# Patient Record
Sex: FEMALE | Race: WHITE | NOT HISPANIC OR LATINO | Employment: UNEMPLOYED | URBAN - METROPOLITAN AREA
[De-identification: names, ages, dates, MRNs, and addresses within clinical notes are randomized per-mention and may not be internally consistent; named-entity substitution may affect disease eponyms.]

---

## 2024-02-22 ENCOUNTER — TRANSFERRED RECORDS (OUTPATIENT)
Dept: HEALTH INFORMATION MANAGEMENT | Facility: CLINIC | Age: 59
End: 2024-02-22

## 2024-02-23 ENCOUNTER — MEDICAL CORRESPONDENCE (OUTPATIENT)
Dept: HEALTH INFORMATION MANAGEMENT | Facility: CLINIC | Age: 59
End: 2024-02-23

## 2024-02-23 ENCOUNTER — TRANSCRIBE ORDERS (OUTPATIENT)
Dept: OTHER | Age: 59
End: 2024-02-23

## 2024-02-23 DIAGNOSIS — M81.0 OSTEOPOROSIS: Primary | ICD-10-CM

## 2024-12-05 NOTE — CONFIDENTIAL NOTE
RECORDS RECEIVED FROM: internal    DATE RECEIVED: 12.31.24    NOTES (FOR ALL VISITS) STATUS DETAILS   OFFICE NOTES from referring provider External   [facility] Meri Cochran MD      OFFICE NOTES from other specialist Received  [facility] [place]   2.22.24    MEDICATION LIST internal     LABS     DIABETES: HBGA1C, CREATININE, FASTING LIPIDS, MICROALBUMIN URINE, POTASSIUM, TSH, T4    THYROID: TSH, T4, CBC, THYRODLONULIN, TOTAL T3, FREE T4, CALCITONIN, CEA internal  [facility] [place]   Lipid- 2.22.24   HBGA1C- 2.22.24

## 2024-12-31 ENCOUNTER — VIRTUAL VISIT (OUTPATIENT)
Dept: ENDOCRINOLOGY | Facility: CLINIC | Age: 59
End: 2024-12-31
Payer: COMMERCIAL

## 2024-12-31 ENCOUNTER — PRE VISIT (OUTPATIENT)
Dept: ENDOCRINOLOGY | Facility: CLINIC | Age: 59
End: 2024-12-31

## 2024-12-31 DIAGNOSIS — M81.0 AGE-RELATED OSTEOPOROSIS WITHOUT CURRENT PATHOLOGICAL FRACTURE: ICD-10-CM

## 2024-12-31 RX ORDER — ALENDRONATE SODIUM 70 MG/1
70 TABLET ORAL
Qty: 12 TABLET | Refills: 3 | Status: SHIPPED | OUTPATIENT
Start: 2024-12-31

## 2024-12-31 NOTE — LETTER
12/31/2024       RE: Beulah Johnson  [address]  [place] [address]     Dear Colleague,    Thank you for referring your patient, Beulah Johnson, to the [facility] ENDOCRINOLOGY CLINIC [place] at [facility]. Please see a copy of my visit note below.    Endocrinology Clinic Visit 12/31/2024      Video-Visit Details    Type of service:  Video Visit    Video Start Time (time video started): 10:17 AM    Video End Time (time video stopped): 10:59 AM    Originating Location (pt. Location): Home        Distant Location (provider location):  On-site    Mode of Communication:  Video Conference via [employer]    Physician has received verbal consent for a Video Visit from the patient? Yes    I spent a total of 61 minutes on the date of encounter reviewing medical records, evaluating the patient, coordinating care and documenting in the EHR, as detailed above.  The longitudinal plan of care for the diagnosis(es)/condition(s) as documented were addressed during this visit. Due to the added complexity in care, I will continue to support Beulah in the subsequent management and with ongoing continuity of care.      NAME:  Beulah Johnson  PCP:  No Ref-Primary, Physician  MRN:  [id number]  Reason for Consult:  osteoporosis  Requesting Provider:  Meri Herr    Chief Complaint     Chief Complaint   Patient presents with     Consult     Osteoporosis        History of Present Illness     Beulah Johnson is a 59 year old female who is seen in video visit for assessment for osteoporosis.  He does not have any significant past medical history apart from osteoporosis today is her first visit with me for assessment and management of osteoporosis.    She had DEXA bone scan in March 2021 showed osteopenia.  Repeated DEXA bone scan at [facility] on 2/14/2023:  Lumbar Spine:     Average BMD (g/cm2): 0.741     T-score: -2.8     Z-score: -1.5       Left Femoral Neck:      Average BMD (g/cm2): 0.683     T-score: -1.5     Z-score: -0.3       Left Hip Total:     Average BMD (g/cm2): 0.825     T-score: -1.0     Z-score: -0.1       Vertebral assessment: frontal and lateral scanograms demonstrate no compression deformities in the thoracic or lumbar spine. Compared to the previous study, the measured bone density in the lumbar spine has not changed significantly and the measured bone density in the left hip has decreased by 6%.     FRAX (10-year Fracture Risk calculated for an untreated patient): not reported in patients with osteoporosis.       The lumbar spine TBS is 1.351 which suggests a normal bone microarchitecture compared to reference population.     No previous medications for osteoporosis.       Calcium intake:   Dietary: no Milk , eats yogurt 2 servings per week , no ice-cream , cottage cheese half cup 2ouple of times/week and sometimes eats slices.  Supplements: Calcium carbonate 700 mg     Vitamin D intake:   Supplements: MV has 1000 units + 5000 units on its own +MV with vitamin D 2000   No Vitamin d level in the records.     Osteoporosis Risk factors:   Previous fractures: no   Family history of fragility fracture in parent: no   Current smoking: no  Steroid Use: no  Rheumatoid  arthritis: no  Alcohol (3 or more units per day): no  Other medications known to affect BMD: no   Reported loss of height:  no   Menstrual history: age at menopause: At the age of 49 years old  Estrogen use after menopause: Started on estrogen pellet started last February   Tendency for falls: no   GI history: Malabsorption (IBD, Celiac, gastric bypass ): no  History of kidney stones: no  History of thyroid disease: no   Physical activity: every day she walks over 2 miles , does push ups and weight lifting   Weight history: no hx of weight issues   Dental history: last visit with the dentist was 15 years ago , no current issues with the teeth, limited exam during the video visit no partials or  caries   History of GERD: only get symptoms with certain types of food , not on any any medications   History of kidney disease: No kidney function in the records. She stated she does not have hx of kidney disease.    Problem List     There is no problem list on file for this patient.       Medications     Current Outpatient Medications   Medication Sig Dispense Refill     alendronate (FOSAMAX) 70 MG tablet Take 1 tablet (70 mg) by mouth every 7 days. Take medication on an empty stomach, first thing in the morning with at least 8 ounces of water--do not take with other drinks. Remain upright (do not lie down) and do not eat or take anything else by mouth (including other pills) until at least 30 minutes after taking the medication. 12 tablet 3     No current facility-administered medications for this visit.        Allergies     No Known Allergies    Medical / Surgical History     No past medical history on file.  No past surgical history on file.    Social History     Social History     Socioeconomic History     Marital status:      Spouse name: Not on file     Number of children: Not on file     Years of education: Not on file     Highest education level: Not on file   Occupational History     Not on file   Tobacco Use     Smoking status: Never     Passive exposure: Never     Smokeless tobacco: Never   Vaping Use     Vaping status: Never Used   Substance and Sexual Activity     Alcohol use: Yes     Drug use: Never     Sexual activity: Yes     Partners: Male     Birth control/protection: Post-menopausal   Other Topics Concern     Not on file   Social History Narrative     Not on file     Social Drivers of Health     Financial Resource Strain: Low Risk  (4/4/2024)    Received [facility]    Financial Resource Strain      Difficulty of Paying Living Expenses: 3      Difficulty of Paying Living Expenses: Not on file   Food Insecurity: No Food Insecurity (4/4/2024)     [facility]    Food Insecurity      Do you worry your food will run out before you are able to buy more?: 1   Transportation Needs: No Transportation Needs (4/4/2024)    [facility]    Transportation Needs      Does lack of transportation keep you from medical appointments?: 1      Does lack of transportation keep you from work, meetings or getting things that you need?: 1   Physical Activity: Not on file   Stress: Not on file   Social Connections: Socially Integrated (4/4/2024)    Received from [employer]    Social Connections      Do you often feel lonely or isolated from those around you?: 0   Interpersonal Safety: Not on file   Housing Stability: Low Risk  (4/4/2024)    Received from [employer]    Housing Stability      What is your housing situation today?: 1       Family History     Family History   Problem Relation Age of Onset     Hyperlipidemia Mother        ROS     12 ROS completed, pertinent positive and negative in HPI    Physical Exam   There were no vitals taken for this visit.   GENERAL: alert and no distress  EYES: Eyes grossly normal to inspection.  No discharge or erythema, or obvious scleral/conjunctival abnormalities.  RESP: No audible wheeze, cough, or visible cyanosis.    SKIN: Visible skin clear. No significant rash, abnormal pigmentation or lesions.  NEURO: Cranial nerves grossly intact.  Mentation and speech appropriate for age.  PSYCH: Appropriate affect, tone, and pace of words     Labs/Imaging     Pertinent Labs were reviewed and discussed briefly.  Radiology Results were  reviewed and discussed briefly.    Summary of recent findings:   EXAM: DEXA BONE DENSITY STUDY     DATE: 2/14/2023 7:41 AM     COMPARISON: 3/12/2021     CLINICAL DATA: Osteoporosis screening. Post-menopausal.     TECHNIQUE: A bone density evaluation using DEXA  (dual energy x-ray absorptiometry) was performed for your patient at the [facility] in [place] using a Hologic (Discovery) unit.     FINDINGS:     Lumbar Spine:     Average BMD (g/cm2): 0.741     T-score: -2.8     Z-score: -1.5       Left Femoral Neck:     Average BMD (g/cm2): 0.683     T-score: -1.5     Z-score: -0.3       Left Hip Total:     Average BMD (g/cm2): 0.825     T-score: -1.0     Z-score: -0.1       Vertebral assessment: frontal and lateral scanograms demonstrate no compression deformities in the thoracic or lumbar spine. Compared to the previous study, the measured bone density in the lumbar spine has not changed significantly and the measured bone density in the left hip has decreased by 6%.     FRAX (10-year Fracture Risk calculated for an untreated patient): not reported in patients with osteoporosis.       The lumbar spine TBS is 1.351 which suggests a normal bone microarchitecture compared to reference population.     The patients associated major osteoporotic fracture risk, based on the combined results of BMD and TBS is in the high risk zone.     I personally reviewed the patient's outside records from [employer] EMR, Care Everywhere, and faxed records. Summary of pertinent findings in HPI.    Impression / Plan     1.  Osteoporosis:  Has known history of osteopenia however she had DEXA bone scan in February 2023 with the lowest T-score of -2.8 at the lumbar spine consistent with osteoporosis lumbar spine TBS was within normal range compared to reference population.    She was started on estrogen menopausal hormone therapy in February 2024.  Overall no significant risk factors for developing osteoporosis apart from postmenopause.    I discussed with her today the options of either continuing with estrogen replacement and to continue to monitor BMD in 2 years from the previous DEXA bone scan or to start treatment for osteoporosis.  She decided to start on osteoporosis medications.   We discussed the different options for treating osteoporosis with low risk of fracture including starting with Fosamax weekly if well-tolerated to continue with it for 3-5 years or to consider starting on Reclast injection annually for a total course of 3 years after going over the risk-benefit she agreed to start on Fosamax initially and if she does not tolerate it well to consider at that time switching to Reclast.  We went over the possible side effects that could be associated with the use of Fosamax including GERD, difficulty swallowing, other GI side effects, risk of hypocalcemia very low/controversial risk of ONJ, atypical femur fracture with prolonged use.      Plan:  -To get the baseline for lab results done.  -To keep calcium intake between 6912-3058 mg daily.  -Continue with weightbearing exercises.  -Continue with using estrogen.  -Following getting the lab results we will start Fosamax if kidney function are allowing for starting Fosamax.  Orders placed for Fosamax today to initiate of the process of insurance approval.          Test and/or medications prescribed today:  Orders Placed This Encounter   Procedures     Basic metabolic panel     Phosphorus     Albumin level     Parathyroid Hormone Intact     25 Hydroxyvitamin D2 and D3     TSH with free T4 reflex     Basic metabolic panel     Parathyroid Hormone Intact     25 Hydroxyvitamin D2 and D3         Follow up: 6 months with labs prior to the visit.    ATTILA Narvaez  Endocrinology, Diabetes and Metabolism  [facility]    Note: Chart documentation done in part with Dragon Voice Recognition software. Although reviewed after completion, some word and grammatical errors may remain.  Please consider this when interpreting information in this chart        Again, thank you for allowing me to participate in the care of your patient.      Sincerely,    ATTILA Narvaez

## 2024-12-31 NOTE — PATIENT INSTRUCTIONS
- To get the labs done   - To reduce the vitamin D to 2000 units daily  AND TO KEEP YOUR CALCIUM INTAKE AROUND 1200 MG   - After getting the results  to let me know before starting Fosamax   - If the results are normal will start fosamax 70 mg weekly   - I will see you back in 6 months with labs before the visit

## 2024-12-31 NOTE — PROGRESS NOTES
Endocrinology Clinic Visit 12/31/2024      Video-Visit Details    Type of service:  Video Visit    Video Start Time (time video started): 10:17 AM    Video End Time (time video stopped): 10:59 AM    Originating Location (pt. Location): Home        Distant Location (provider location):  On-site    Mode of Communication:  Video Conference via [employer]    Physician has received verbal consent for a Video Visit from the patient? Yes    I spent a total of 61 minutes on the date of encounter reviewing medical records, evaluating the patient, coordinating care and documenting in the EHR, as detailed above.  The longitudinal plan of care for the diagnosis(es)/condition(s) as documented were addressed during this visit. Due to the added complexity in care, I will continue to support Beulah in the subsequent management and with ongoing continuity of care.      NAME:  Beulah Johnson  PCP:  No Ref-Primary, Physician  MRN:  [id number]  Reason for Consult:  osteoporosis  Requesting Provider:  Meri Herr    Chief Complaint     Chief Complaint   Patient presents with    Consult     Osteoporosis        History of Present Illness     Beulah Johsnon is a 59 year old female who is seen in video visit for assessment for osteoporosis.  He does not have any significant past medical history apart from osteoporosis today is her first visit with me for assessment and management of osteoporosis.    She had DEXA bone scan in March 2021 showed osteopenia.  Repeated DEXA bone scan at [facility] on 2/14/2023:  Lumbar Spine:     Average BMD (g/cm2): 0.741     T-score: -2.8     Z-score: -1.5       Left Femoral Neck:     Average BMD (g/cm2): 0.683     T-score: -1.5     Z-score: -0.3       Left Hip Total:     Average BMD (g/cm2): 0.825     T-score: -1.0     Z-score: -0.1       Vertebral assessment: frontal and lateral scanograms demonstrate no compression deformities in the thoracic or lumbar spine. Compared to the previous study,  the measured bone density in the lumbar spine has not changed significantly and the measured bone density in the left hip has decreased by 6%.     FRAX (10-year Fracture Risk calculated for an untreated patient): not reported in patients with osteoporosis.       The lumbar spine TBS is 1.351 which suggests a normal bone microarchitecture compared to reference population.     No previous medications for osteoporosis.       Calcium intake:   Dietary: no Milk , eats yogurt 2 servings per week , no ice-cream , cottage cheese half cup 2ouple of times/week and sometimes eats slices.  Supplements: Calcium carbonate 700 mg     Vitamin D intake:   Supplements: MV has 1000 units + 5000 units on its own +MV with vitamin D 2000   No Vitamin d level in the records.     Osteoporosis Risk factors:   Previous fractures: no   Family history of fragility fracture in parent: no   Current smoking: no  Steroid Use: no  Rheumatoid  arthritis: no  Alcohol (3 or more units per day): no  Other medications known to affect BMD: no   Reported loss of height:  no   Menstrual history: age at menopause: At the age of 49 years old  Estrogen use after menopause: Started on estrogen pellet started last February   Tendency for falls: no   GI history: Malabsorption (IBD, Celiac, gastric bypass ): no  History of kidney stones: no  History of thyroid disease: no   Physical activity: every day she walks over 2 miles , does push ups and weight lifting   Weight history: no hx of weight issues   Dental history: last visit with the dentist was 15 years ago , no current issues with the teeth, limited exam during the video visit no partials or caries   History of GERD: only get symptoms with certain types of food , not on any any medications   History of kidney disease: No kidney function in the records. She stated she does not have hx of kidney disease.    Problem List     There is no problem list on file for this patient.       Medications     Current  Outpatient Medications   Medication Sig Dispense Refill    alendronate (FOSAMAX) 70 MG tablet Take 1 tablet (70 mg) by mouth every 7 days. Take medication on an empty stomach, first thing in the morning with at least 8 ounces of water--do not take with other drinks. Remain upright (do not lie down) and do not eat or take anything else by mouth (including other pills) until at least 30 minutes after taking the medication. 12 tablet 3     No current facility-administered medications for this visit.        Allergies     No Known Allergies    Medical / Surgical History     No past medical history on file.  No past surgical history on file.    Social History     Social History     Socioeconomic History    Marital status:      Spouse name: Not on file    Number of children: Not on file    Years of education: Not on file    Highest education level: Not on file   Occupational History    Not on file   Tobacco Use    Smoking status: Never     Passive exposure: Never    Smokeless tobacco: Never   Vaping Use    Vaping status: Never Used   Substance and Sexual Activity    Alcohol use: Yes    Drug use: Never    Sexual activity: Yes     Partners: Male     Birth control/protection: Post-menopausal   Other Topics Concern    Not on file   Social History Narrative    Not on file     Social Drivers of Health     Financial Resource Strain: Low Risk  (4/4/2024)    [facility]    Financial Resource Strain     Difficulty of Paying Living Expenses: 3     Difficulty of Paying Living Expenses: Not on file   Food Insecurity: No Food Insecurity (4/4/2024)    Received from [facility]    Food Insecurity     Do you worry your food will run out before you are able to buy more?: 1   Transportation Needs: No Transportation Needs (4/4/2024)    [facility]    Transportation Needs     Does lack of transportation keep you  from medical appointments?: 1     Does lack of transportation keep you from work, meetings or getting things that you need?: 1   Physical Activity: Not on file   Stress: Not on file   Social Connections: Socially Integrated (4/4/2024)    Received from [facility]    Social Connections     Do you often feel lonely or isolated from those around you?: 0   Interpersonal Safety: Not on file   Housing Stability: Low Risk  (4/4/2024)    Received from [facility]    Housing Stability     What is your housing situation today?: 1       Family History     Family History   Problem Relation Age of Onset    Hyperlipidemia Mother        ROS     12 ROS completed, pertinent positive and negative in HPI    Physical Exam   There were no vitals taken for this visit.   GENERAL: alert and no distress  EYES: Eyes grossly normal to inspection.  No discharge or erythema, or obvious scleral/conjunctival abnormalities.  RESP: No audible wheeze, cough, or visible cyanosis.    SKIN: Visible skin clear. No significant rash, abnormal pigmentation or lesions.  NEURO: Cranial nerves grossly intact.  Mentation and speech appropriate for age.  PSYCH: Appropriate affect, tone, and pace of words     Labs/Imaging     Pertinent Labs were reviewed and discussed briefly.  Radiology Results were  reviewed and discussed briefly.    Summary of recent findings:   EXAM: DEXA BONE DENSITY STUDY     DATE: 2/14/2023 7:41 AM     COMPARISON: 3/12/2021     CLINICAL DATA: Osteoporosis screening. Post-menopausal.     TECHNIQUE: A bone density evaluation using DEXA (dual energy x-ray absorptiometry) was performed for your patient at the [facility] in [place] using a [employer] (Discovery) unit.     FINDINGS:     Lumbar Spine:     Average BMD (g/cm2): 0.741     T-score: -2.8     Z-score: -1.5       Left Femoral Neck:     Average BMD (g/cm2): 0.683     T-score: -1.5     Z-score: -0.3        Left Hip Total:     Average BMD (g/cm2): 0.825     T-score: -1.0     Z-score: -0.1       Vertebral assessment: frontal and lateral scanograms demonstrate no compression deformities in the thoracic or lumbar spine. Compared to the previous study, the measured bone density in the lumbar spine has not changed significantly and the measured bone density in the left hip has decreased by 6%.     FRAX (10-year Fracture Risk calculated for an untreated patient): not reported in patients with osteoporosis.       The lumbar spine TBS is 1.351 which suggests a normal bone microarchitecture compared to reference population.     The patients associated major osteoporotic fracture risk, based on the combined results of BMD and TBS is in the high risk zone.     I personally reviewed the patient's outside records from [employer] EMR, Care Everywhere, and faxed records. Summary of pertinent findings in HPI.    Impression / Plan     1.  Osteoporosis:  Has known history of osteopenia however she had DEXA bone scan in February 2023 with the lowest T-score of -2.8 at the lumbar spine consistent with osteoporosis lumbar spine TBS was within normal range compared to reference population.    She was started on estrogen menopausal hormone therapy in February 2024.  Overall no significant risk factors for developing osteoporosis apart from postmenopause.    I discussed with her today the options of either continuing with estrogen replacement and to continue to monitor BMD in 2 years from the previous DEXA bone scan or to start treatment for osteoporosis.  She decided to start on osteoporosis medications.  We discussed the different options for treating osteoporosis with low risk of fracture including starting with Fosamax weekly if well-tolerated to continue with it for 3-5 years or to consider starting on Reclast injection annually for a total course of 3 years after going over the risk-benefit she agreed to start on Fosamax initially and  if she does not tolerate it well to consider at that time switching to Reclast.  We went over the possible side effects that could be associated with the use of Fosamax including GERD, difficulty swallowing, other GI side effects, risk of hypocalcemia very low/controversial risk of ONJ, atypical femur fracture with prolonged use.      Plan:  -To get the baseline for lab results done.  -To keep calcium intake between 4053-8827 mg daily.  -Continue with weightbearing exercises.  -Continue with using estrogen.  -Following getting the lab results we will start Fosamax if kidney function are allowing for starting Fosamax.  Orders placed for Fosamax today to initiate of the process of insurance approval.          Test and/or medications prescribed today:  Orders Placed This Encounter   Procedures    Basic metabolic panel    Phosphorus    Albumin level    Parathyroid Hormone Intact    25 Hydroxyvitamin D2 and D3    TSH with free T4 reflex    Basic metabolic panel    Parathyroid Hormone Intact    25 Hydroxyvitamin D2 and D3         Follow up: 6 months with labs prior to the visit.    ATTILA Narvaez  Endocrinology, Diabetes and Metabolism  [facility]    Note: Chart documentation done in part with Dragon Voice Recognition software. Although reviewed after completion, some word and grammatical errors may remain.  Please consider this when interpreting information in this chart

## 2024-12-31 NOTE — NURSING NOTE
Current patient location: [address] [facility] [address]    Is the patient currently in the state of [place]? YES    Visit mode:VIDEO    If the visit is dropped, the patient can be reconnected by:VIDEO VISIT: Text to cell phone:   Telephone Information:   Mobile [phone]       Will anyone else be joining the visit? NO  (If patient encounters technical issues they should call [phone]    Are changes needed to the allergy or medication list? Yes pt is taking Progesterone 200mg 1xday    Are refills needed on medications prescribed by this physician? NO- new pt     Rooming Documentation:  Questionnaire(s) completed    Reason for visit: Consult (Osteoporosis )    Meghan GIRONF

## 2025-01-02 ENCOUNTER — TELEPHONE (OUTPATIENT)
Dept: ENDOCRINOLOGY | Facility: CLINIC | Age: 60
End: 2025-01-02
Payer: COMMERCIAL

## 2025-01-02 NOTE — TELEPHONE ENCOUNTER
Patient confirmed scheduled appointment:  Date: 06/03/25  Time: 8:00am  Visit type: RETURN ENDOCRINE  Provider: ATTILA Narvaez  Location: [facility] DIABETES  Testing/imaging:   Additional notes: Virtual

## 2025-01-08 ENCOUNTER — LAB (OUTPATIENT)
Dept: LAB | Facility: CLINIC | Age: 60
End: 2025-01-08
Payer: COMMERCIAL

## 2025-01-08 DIAGNOSIS — M81.0 AGE-RELATED OSTEOPOROSIS WITHOUT CURRENT PATHOLOGICAL FRACTURE: ICD-10-CM

## 2025-01-08 PROCEDURE — 36415 COLL VENOUS BLD VENIPUNCTURE: CPT

## 2025-01-08 PROCEDURE — 83970 ASSAY OF PARATHORMONE: CPT

## 2025-01-08 PROCEDURE — 84443 ASSAY THYROID STIM HORMONE: CPT

## 2025-01-08 PROCEDURE — 80069 RENAL FUNCTION PANEL: CPT

## 2025-01-09 LAB
ALBUMIN SERPL BCG-MCNC: 4.7 G/DL (ref 3.5–5.2)
ANION GAP SERPL CALCULATED.3IONS-SCNC: 13 MMOL/L (ref 7–15)
BUN SERPL-MCNC: 11.8 MG/DL (ref 8–23)
CALCIUM SERPL-MCNC: 10.1 MG/DL (ref 8.8–10.4)
CHLORIDE SERPL-SCNC: 101 MMOL/L (ref 98–107)
CREAT SERPL-MCNC: 0.69 MG/DL (ref 0.51–0.95)
EGFRCR SERPLBLD CKD-EPI 2021: >90 ML/MIN/1.73M2
GLUCOSE SERPL-MCNC: 104 MG/DL (ref 70–99)
HCO3 SERPL-SCNC: 24 MMOL/L (ref 22–29)
PHOSPHATE SERPL-MCNC: 3 MG/DL (ref 2.5–4.5)
POTASSIUM SERPL-SCNC: 4 MMOL/L (ref 3.4–5.3)
PTH-INTACT SERPL-MCNC: 44 PG/ML (ref 15–65)
SODIUM SERPL-SCNC: 138 MMOL/L (ref 135–145)
TSH SERPL DL<=0.005 MIU/L-ACNC: 2.24 UIU/ML (ref 0.3–4.2)

## 2025-01-14 LAB
DEPRECATED CALCIDIOL+CALCIFEROL SERPL-MC: <91 UG/L (ref 20–75)
VITAMIN D2 SERPL-MCNC: <5 UG/L
VITAMIN D3 SERPL-MCNC: 86 UG/L

## 2025-01-19 ENCOUNTER — HEALTH MAINTENANCE LETTER (OUTPATIENT)
Age: 60
End: 2025-01-19

## 2025-03-27 ENCOUNTER — TELEPHONE (OUTPATIENT)
Dept: ENDOCRINOLOGY | Facility: CLINIC | Age: 60
End: 2025-03-27
Payer: COMMERCIAL

## 2025-03-27 NOTE — TELEPHONE ENCOUNTER
Patient confirmed scheduled appointment:  Date: 7/8/25  Time: 9:00am  Visit type: RETURN ENDOCRINE  Provider: ATTILA Narvaez  Location: [facility] DIABETES Virtual  Testing/imaging: N/A  Additional notes:  Spoke to pt to reschedule appt on 6/3 due to changes in the providers schedule.  Pt will be in [place] for this visit.    Jones Paez on 3/27/2025 at 2:30 PM

## 2025-07-08 ENCOUNTER — VIRTUAL VISIT (OUTPATIENT)
Dept: ENDOCRINOLOGY | Facility: CLINIC | Age: 60
End: 2025-07-08
Payer: COMMERCIAL

## 2025-07-08 DIAGNOSIS — M81.0 AGE-RELATED OSTEOPOROSIS WITHOUT CURRENT PATHOLOGICAL FRACTURE: ICD-10-CM

## 2025-07-08 PROCEDURE — 98006 SYNCH AUDIO-VIDEO EST MOD 30: CPT | Performed by: STUDENT IN AN ORGANIZED HEALTH CARE EDUCATION/TRAINING PROGRAM

## 2025-07-08 PROCEDURE — 1126F AMNT PAIN NOTED NONE PRSNT: CPT | Mod: 95 | Performed by: STUDENT IN AN ORGANIZED HEALTH CARE EDUCATION/TRAINING PROGRAM

## 2025-07-08 PROCEDURE — G2211 COMPLEX E/M VISIT ADD ON: HCPCS | Performed by: STUDENT IN AN ORGANIZED HEALTH CARE EDUCATION/TRAINING PROGRAM

## 2025-07-08 RX ORDER — ALENDRONATE SODIUM 70 MG/1
70 TABLET ORAL
Qty: 12 TABLET | Refills: 5 | Status: SHIPPED | OUTPATIENT
Start: 2025-07-08

## 2025-07-08 RX ORDER — PROGESTERONE 200 MG/1
CAPSULE ORAL
COMMUNITY

## 2025-07-08 NOTE — LETTER
7/8/2025       RE: Beulah Johnson  [address] [facility] [address]     Dear Colleague,    Thank you for referring your patient, Beulah Johnson, to the [facility]OCRINOLOG[facility] [place] at [facility]. Please see a copy of my visit note below.    Endocrinology Clinic Visit 7/8/2025      Video-Visit Details    Type of service:  Video Visit    Video Start Time (time video started): 9:06 AM    Video End Time (time video stopped): 9:18 AM    Originating Location (pt. Location): Home        Distant Location (provider location):  Off-site    Mode of Communication:  Video Conference via [employer]    Physician has received verbal consent for a Video Visit from the patient? Yes    I spent a total of 32 minutes on the date of encounter reviewing medical records, evaluating the patient, coordinating care and documenting in the EHR, as detailed above.  The longitudinal plan of care for the diagnosis(es)/condition(s) as documented were addressed during this visit. Due to the added complexity in care, I will continue to support Beulah in the subsequent management and with ongoing continuity of care.    NAME:  Beulah Johnson  PCP:  No Ref-Primary, Physician  MRN:  [id number]  Reason for Consult: Osteoporosis  Requesting Provider:  Referred Self    Chief Complaint     Chief Complaint   Patient presents with     RECHECK       History of Present Illness     Beulah Johnson is a 59 year old female who is seen in video visit for follow-up for history of osteoporosis.  Last visit was on 12/31/2024.    History of osteoporosis:  She had DEXA bone scan in March 2021 showed osteopenia.  Repeated DEXA bone scan at [facility] on 2/14/2023:  Lumbar Spine:     Average BMD (g/cm2): 0.741     T-score: -2.8     Z-score: -1.5       Left Femoral Neck:     Average BMD (g/cm2): 0.683     T-score: -1.5     Z-score: -0.3       Left Hip Total:     Average BMD (g/cm2): 0.825      T-score: -1.0     Z-score: -0.1       Vertebral assessment: frontal and lateral scanograms demonstrate no compression deformities in the thoracic or lumbar spine. Compared to the previous study, the measured bone density in the lumbar spine has not changed significantly and the measured bone density in the left hip has decreased by 6%.       The lumbar spine TBS is 1.351 which suggests a normal bone microarchitecture compared to reference population.     Bone specific medications:  We discussed with her regarding the different options of treating osteoporosis on the initial visit she decided to start on Fosamax.  Started Fosamax 70 mg weekly in January 2025.  Adherence: good   Tolerance: well tolerated no SE     Interval falls: no   Interval fractures:no  Interval dental history: last visit 06/2025 had cleaning   Current physical activity: walks daily 2 miles , weight lifting     Calcium intake:   Dietary: no Milk , eats yogurt 2 servings per week , no ice-cream , cottage cheese half cup couple of times/week and sometimes eats slices.  Supplements: Calcium carbonate 700 mg twice daily      Vitamin D intake:   Supplements: Was initially on MV has 1000 units + 5000 units on its own +MV with vitamin D 2000   Vitamin D level was found to be elevated 86 was advised to reduce vitamin D down to 2000 units daily. Today stated she takes 1000 every other day      Osteoporosis Risk factors:   Previous fractures: no   Family history of fragility fracture in parent: no   Current smoking: no  Steroid Use: no  Rheumatoid  arthritis: no  Alcohol (3 or more units per day): no  Other medications known to affect BMD: no   Reported loss of height:  no   Menstrual history: age at menopause: At the age of 49 years old  Estrogen use after menopause: Started on estrogen pellet started February 2024  Tendency for falls: no   GI history: Malabsorption (IBD, Celiac, gastric bypass ): no  History of kidney stones: no  History of thyroid  disease: no   Weight history: no hx of weight issues   History of kidney disease: No history of kidney disease    Problem List     There is no problem list on file for this patient.       Medications     Current Outpatient Medications   Medication Sig Dispense Refill     alendronate (FOSAMAX) 70 MG tablet Take 1 tablet (70 mg) by mouth every 7 days. Take medication on an empty stomach, first thing in the morning with at least 8 ounces of water--do not take with other drinks. Remain upright (do not lie down) and do not eat or take anything else by mouth (including other pills) until at least 30 minutes after taking the medication. 12 tablet 5     progesterone (PROMETRIUM) 200 MG capsule TAKE 1 CAPSULE BY MOUTH EVERY DAY AFTER THE EVENING MEAL       No current facility-administered medications for this visit.        Allergies     No Known Allergies    Medical / Surgical History     No past medical history on file.  No past surgical history on file.    Social History     Social History     Socioeconomic History     Marital status:      Spouse name: Not on file     Number of children: Not on file     Years of education: Not on file     Highest education level: Not on file   Occupational History     Not on file   Tobacco Use     Smoking status: Never     Passive exposure: Never     Smokeless tobacco: Never   Vaping Use     Vaping status: Never Used   Substance and Sexual Activity     Alcohol use: Yes     Drug use: Never     Sexual activity: Yes     Partners: Male     Birth control/protection: Post-menopausal   Other Topics Concern     Not on file   Social History Narrative     Not on file     Social Drivers of Health     Financial Resource Strain: Low Risk  (4/10/2025)    [facility]    Financial Resource Strain      Difficulty of Paying Living Expenses: 3      Difficulty of Paying Living Expenses: Not on file   Food Insecurity: No Food Insecurity (4/10/2025)     "Received from [facility]    Food Insecurity      Do you worry your food will run out before you are able to buy more?: 1   Transportation Needs: No Transportation Needs (4/10/2025)    [facility]    Transportation Needs      Does lack of transportation keep you from medical appointments?: 1      Does lack of transportation keep you from work, meetings or getting things that you need?: 1   Physical Activity: Not on file   Stress: Not on file   Social Connections: Socially Integrated (4/10/2025)    Received from [facility]    Social Connections      Do you often feel lonely or isolated from those around you?: 0   Interpersonal Safety: Not on file   Housing Stability: Low Risk  (4/10/2025)    Received from [facility]    Housing Stability      What is your housing situation today?: 1       Family History     Family History   Problem Relation Age of Onset     Hyperlipidemia Mother        ROS     12 ROS completed, pertinent positive and negative in HPI    Physical Exam   There were no vitals taken for this visit.   GENERAL: alert and no distress  EYES: Eyes grossly normal to inspection.  No discharge or erythema, or obvious scleral/conjunctival abnormalities.  RESP: No audible wheeze, cough, or visible cyanosis.    SKIN: Visible skin clear. No significant rash, abnormal pigmentation or lesions.  NEURO: Cranial nerves grossly intact.  Mentation and speech appropriate for age.  PSYCH: Appropriate affect, tone, and pace of words     Labs/Imaging     Pertinent Labs were reviewed and discussed briefly.  Radiology Results were  reviewed and discussed briefly.    Summary of recent findings:   No results found for: \"A1C\"    TSH   Date Value Ref Range Status   01/08/2025 2.24 0.30 - 4.20 uIU/mL Final       Creatinine   Date Value Ref Range Status   01/08/2025 0.69 0.51 - 0.95 mg/dL " Final      Latest Ref Rng 1/8/2025  1:40 PM   ENDO CALCIUM LABS-UMP     25 OH Vit D total 20 - 75 ug/L <91 (H)    25 OH Vit D2 ug/L <5    25 OH Vit D3 ug/L 86    Albumin 3.5 - 5.2 g/dL 4.7    Calcium 8.8 - 10.4 mg/dL 10.1    Urea Nitrogen 8.0 - 23.0 mg/dL 11.8    Creatinine 0.51 - 0.95 mg/dL 0.69    Parathyroid Hormone Intact 15 - 65 pg/mL 44       Legend:  (H) High  EXAM: DEXA BONE DENSITY STUDY     DATE: 2/14/2023 7:41 AM     COMPARISON: 3/12/2021     CLINICAL DATA: Osteoporosis screening. Post-menopausal.     TECHNIQUE: A bone density evaluation using DEXA (dual energy x-ray absorptiometry) was performed for your patient at the [facility] in [place] using a Hologic (Discovery) unit.     FINDINGS:     Lumbar Spine:     Average BMD (g/cm2): 0.741     T-score: -2.8     Z-score: -1.5       Left Femoral Neck:     Average BMD (g/cm2): 0.683     T-score: -1.5     Z-score: -0.3       Left Hip Total:     Average BMD (g/cm2): 0.825     T-score: -1.0     Z-score: -0.1       Vertebral assessment: frontal and lateral scanograms demonstrate no compression deformities in the thoracic or lumbar spine. Compared to the previous study, the measured bone density in the lumbar spine has not changed significantly and the measured bone density in the left hip has decreased by 6%.     FRAX (10-year Fracture Risk calculated for an untreated patient): not reported in patients with osteoporosis.       The lumbar spine TBS is 1.351 which suggests a normal bone microarchitecture compared to reference population.     The patients associated major osteoporotic fracture risk, based on the combined results of BMD and TBS is in the high risk zone.      I personally reviewed the patient's outside records from [facility] EMR and faxed records. Summary of pertinent findings in HPI.    Impression / Plan     1.  Osteoporosis without current pathological fracture:  Has known history of osteopenia however she had DEXA bone scan in  February 2023 with the lowest T-score of -2.8 at the lumbar spine consistent with osteoporosis lumbar spine TBS was within normal range compared to reference population.     She was started on estrogen menopausal hormone therapy in February 2024.  Overall no significant risk factors for developing osteoporosis .    Started on Fosamax 70 mg weekly in January 2025.  Fosamax well-tolerated no side effects.    She takes calcium carbonate total of 1400 mg daily, takes vitamin D 1000 units every other day.    Plan:  -Continue Fosamax 70 mg weekly.  -Top get DEXA bone scan in January 2026  in [facility] by her PCP  - To get repeat labs.      Test and/or medications prescribed today:  Orders Placed This Encounter   Procedures     Basic metabolic panel     Vitamin D Deficiency     Basic metabolic panel     Vitamin D Deficiency         Follow up: 1 year with labs    ATTILA Narvaez  Endocrinology, Diabetes and Metabolism  [facility]    Note: Chart documentation done in part with Dragon Voice Recognition software. Although reviewed after completion, some word and grammatical errors may remain.  Please consider this when interpreting information in this chart      Again, thank you for allowing me to participate in the care of your patient.      Sincerely,    ATTILA Narvaez

## 2025-07-08 NOTE — PATIENT INSTRUCTIONS
- To continue to take Fosamax 70 mg weekly   - To get the labs done   - To ask your doctor to order Dexa bone scan at [facility] in January 2026   - I will see you in 1 year with labs prior to the visit

## 2025-07-08 NOTE — PROGRESS NOTES
Endocrinology Clinic Visit 7/8/2025      Video-Visit Details    Type of service:  Video Visit    Video Start Time (time video started): 9:06 AM    Video End Time (time video stopped): 9:18 AM    Originating Location (pt. Location): Home        Distant Location (provider location):  Off-site    Mode of Communication:  Video Conference via [employer]    Physician has received verbal consent for a Video Visit from the patient? Yes    I spent a total of 32 minutes on the date of encounter reviewing medical records, evaluating the patient, coordinating care and documenting in the EHR, as detailed above.  The longitudinal plan of care for the diagnosis(es)/condition(s) as documented were addressed during this visit. Due to the added complexity in care, I will continue to support Beulah in the subsequent management and with ongoing continuity of care.    NAME:  Beulah Johnson  PCP:  No Ref-Primary, Physician  MRN:  [id number]  Reason for Consult: Osteoporosis  Requesting Provider:  Referred Self    Chief Complaint     Chief Complaint   Patient presents with    RECHECK       History of Present Illness     Beulah Johnson is a 59 year old female who is seen in video visit for follow-up for history of osteoporosis.  Last visit was on 12/31/2024.    History of osteoporosis:  She had DEXA bone scan in March 2021 showed osteopenia.  Repeated DEXA bone scan at [facility] on 2/14/2023:  Lumbar Spine:     Average BMD (g/cm2): 0.741     T-score: -2.8     Z-score: -1.5       Left Femoral Neck:     Average BMD (g/cm2): 0.683     T-score: -1.5     Z-score: -0.3       Left Hip Total:     Average BMD (g/cm2): 0.825     T-score: -1.0     Z-score: -0.1       Vertebral assessment: frontal and lateral scanograms demonstrate no compression deformities in the thoracic or lumbar spine. Compared to the previous study, the measured bone density in the lumbar spine has not changed significantly and the measured bone density in the  left hip has decreased by 6%.       The lumbar spine TBS is 1.351 which suggests a normal bone microarchitecture compared to reference population.     Bone specific medications:  We discussed with her regarding the different options of treating osteoporosis on the initial visit she decided to start on Fosamax.  Started Fosamax 70 mg weekly in January 2025.  Adherence: good   Tolerance: well tolerated no SE     Interval falls: no   Interval fractures:no  Interval dental history: last visit 06/2025 had cleaning   Current physical activity: walks daily 2 miles , weight lifting     Calcium intake:   Dietary: no Milk , eats yogurt 2 servings per week , no ice-cream , cottage cheese half cup couple of times/week and sometimes eats slices.  Supplements: Calcium carbonate 700 mg twice daily      Vitamin D intake:   Supplements: Was initially on MV has 1000 units + 5000 units on its own +MV with vitamin D 2000   Vitamin D level was found to be elevated 86 was advised to reduce vitamin D down to 2000 units daily. Today stated she takes 1000 every other day      Osteoporosis Risk factors:   Previous fractures: no   Family history of fragility fracture in parent: no   Current smoking: no  Steroid Use: no  Rheumatoid  arthritis: no  Alcohol (3 or more units per day): no  Other medications known to affect BMD: no   Reported loss of height:  no   Menstrual history: age at menopause: At the age of 49 years old  Estrogen use after menopause: Started on estrogen pellet started February 2024  Tendency for falls: no   GI history: Malabsorption (IBD, Celiac, gastric bypass ): no  History of kidney stones: no  History of thyroid disease: no   Weight history: no hx of weight issues   History of kidney disease: No history of kidney disease    Problem List     There is no problem list on file for this patient.       Medications     Current Outpatient Medications   Medication Sig Dispense Refill    alendronate (FOSAMAX) 70 MG tablet Take 1  tablet (70 mg) by mouth every 7 days. Take medication on an empty stomach, first thing in the morning with at least 8 ounces of water--do not take with other drinks. Remain upright (do not lie down) and do not eat or take anything else by mouth (including other pills) until at least 30 minutes after taking the medication. 12 tablet 5    progesterone (PROMETRIUM) 200 MG capsule TAKE 1 CAPSULE BY MOUTH EVERY DAY AFTER THE EVENING MEAL       No current facility-administered medications for this visit.        Allergies     No Known Allergies    Medical / Surgical History     No past medical history on file.  No past surgical history on file.    Social History     Social History     Socioeconomic History    Marital status:      Spouse name: Not on file    Number of children: Not on file    Years of education: Not on file    Highest education level: Not on file   Occupational History    Not on file   Tobacco Use    Smoking status: Never     Passive exposure: Never    Smokeless tobacco: Never   Vaping Use    Vaping status: Never Used   Substance and Sexual Activity    Alcohol use: Yes    Drug use: Never    Sexual activity: Yes     Partners: Male     Birth control/protection: Post-menopausal   Other Topics Concern    Not on file   Social History Narrative    Not on file     Social Drivers of Health     Financial Resource Strain: Low Risk  (4/10/2025)    [facility]    Financial Resource Strain     Difficulty of Paying Living Expenses: 3     Difficulty of Paying Living Expenses: Not on file   Food Insecurity: No Food Insecurity (4/10/2025)    Received from [facility]    Food Insecurity     Do you worry your food will run out before you are able to buy more?: 1   Transportation Needs: No Transportation Needs (4/10/2025)    [facility]    Transportation Needs     Does lack of transportation  "keep you from medical appointments?: 1     Does lack of transportation keep you from work, meetings or getting things that you need?: 1   Physical Activity: Not on file   Stress: Not on file   Social Connections: Socially Integrated (4/10/2025)    Received from [employer] [facility]    Social Connections     Do you often feel lonely or isolated from those around you?: 0   Interpersonal Safety: Not on file   Housing Stability: Low Risk  (4/10/2025)    Received from [facility]    Housing Stability     What is your housing situation today?: 1       Family History     Family History   Problem Relation Age of Onset    Hyperlipidemia Mother        ROS     12 ROS completed, pertinent positive and negative in HPI    Physical Exam   There were no vitals taken for this visit.   GENERAL: alert and no distress  EYES: Eyes grossly normal to inspection.  No discharge or erythema, or obvious scleral/conjunctival abnormalities.  RESP: No audible wheeze, cough, or visible cyanosis.    SKIN: Visible skin clear. No significant rash, abnormal pigmentation or lesions.  NEURO: Cranial nerves grossly intact.  Mentation and speech appropriate for age.  PSYCH: Appropriate affect, tone, and pace of words     Labs/Imaging     Pertinent Labs were reviewed and discussed briefly.  Radiology Results were  reviewed and discussed briefly.    Summary of recent findings:   No results found for: \"A1C\"    TSH   Date Value Ref Range Status   01/08/2025 2.24 0.30 - 4.20 uIU/mL Final       Creatinine   Date Value Ref Range Status   01/08/2025 0.69 0.51 - 0.95 mg/dL Final      Latest Ref Rng 1/8/2025  1:40 PM   ENDO CALCIUM LABS-UMP     25 OH Vit D total 20 - 75 ug/L <91 (H)    25 OH Vit D2 ug/L <5    25 OH Vit D3 ug/L 86    Albumin 3.5 - 5.2 g/dL 4.7    Calcium 8.8 - 10.4 mg/dL 10.1    Urea Nitrogen 8.0 - 23.0 mg/dL 11.8    Creatinine 0.51 - 0.95 mg/dL 0.69    Parathyroid Hormone Intact 15 - 65 " pg/mL 44       Legend:  (H) High  EXAM: DEXA BONE DENSITY STUDY     DATE: 2/14/2023 7:41 AM     COMPARISON: 3/12/2021     CLINICAL DATA: Osteoporosis screening. Post-menopausal.     TECHNIQUE: A bone density evaluation using DEXA (dual energy x-ray absorptiometry) was performed for your patient at the [facility] in [place] using a Hologic (Discovery) unit.     FINDINGS:     Lumbar Spine:     Average BMD (g/cm2): 0.741     T-score: -2.8     Z-score: -1.5       Left Femoral Neck:     Average BMD (g/cm2): 0.683     T-score: -1.5     Z-score: -0.3       Left Hip Total:     Average BMD (g/cm2): 0.825     T-score: -1.0     Z-score: -0.1       Vertebral assessment: frontal and lateral scanograms demonstrate no compression deformities in the thoracic or lumbar spine. Compared to the previous study, the measured bone density in the lumbar spine has not changed significantly and the measured bone density in the left hip has decreased by 6%.     FRAX (10-year Fracture Risk calculated for an untreated patient): not reported in patients with osteoporosis.       The lumbar spine TBS is 1.351 which suggests a normal bone microarchitecture compared to reference population.     The patients associated major osteoporotic fracture risk, based on the combined results of BMD and TBS is in the high risk zone.      I personally reviewed the patient's outside records from [facility] EMR and faxed records. Summary of pertinent findings in HPI.    Impression / Plan     1.  Osteoporosis without current pathological fracture:  Has known history of osteopenia however she had DEXA bone scan in February 2023 with the lowest T-score of -2.8 at the lumbar spine consistent with osteoporosis lumbar spine TBS was within normal range compared to reference population.     She was started on estrogen menopausal hormone therapy in February 2024.  Overall no significant risk factors for developing osteoporosis .    Started on Fosamax 70 mg  weekly in January 2025.  Fosamax well-tolerated no side effects.    She takes calcium carbonate total of 1400 mg daily, takes vitamin D 1000 units every other day.    Plan:  -Continue Fosamax 70 mg weekly.  -Top get DEXA bone scan in January 2026  in [facility] by her PCP  - To get repeat labs.      Test and/or medications prescribed today:  Orders Placed This Encounter   Procedures    Basic metabolic panel    Vitamin D Deficiency    Basic metabolic panel    Vitamin D Deficiency         Follow up: 1 year with labs    ATTILA Narvaez  Endocrinology, Diabetes and Metabolism  [facility]    Note: Chart documentation done in part with Dragon Voice Recognition software. Although reviewed after completion, some word and grammatical errors may remain.  Please consider this when interpreting information in this chart

## 2025-07-08 NOTE — NURSING NOTE
Current patient location: [place]    Is the patient currently in the state of [place]? YES    Visit mode: VIDEO    If the visit is dropped, the patient can be reconnected by:VIDEO VISIT: Text to cell phone:   Telephone Information:   Mobile [phone]       Will anyone else be joining the visit? NO  (If patient encounters technical issues they should call [phone])    Are changes needed to the allergy or medication list? No    Are refills needed on medications prescribed by this physician? NO    Rooming Documentation:  Questionnaire(s) completed    Reason for visit: RECHECK    Margie GRAYSON

## 2025-07-23 ENCOUNTER — LAB (OUTPATIENT)
Dept: LAB | Facility: CLINIC | Age: 60
End: 2025-07-23
Payer: COMMERCIAL

## 2025-07-23 DIAGNOSIS — M81.0 AGE-RELATED OSTEOPOROSIS WITHOUT CURRENT PATHOLOGICAL FRACTURE: ICD-10-CM

## 2025-07-23 PROCEDURE — 80048 BASIC METABOLIC PNL TOTAL CA: CPT

## 2025-07-23 PROCEDURE — 82306 VITAMIN D 25 HYDROXY: CPT

## 2025-07-23 PROCEDURE — 83970 ASSAY OF PARATHORMONE: CPT

## 2025-07-23 PROCEDURE — 36415 COLL VENOUS BLD VENIPUNCTURE: CPT

## 2025-07-24 LAB
ANION GAP SERPL CALCULATED.3IONS-SCNC: 9 MMOL/L (ref 7–15)
BUN SERPL-MCNC: 16.3 MG/DL (ref 8–23)
CALCIUM SERPL-MCNC: 10.3 MG/DL (ref 8.8–10.4)
CHLORIDE SERPL-SCNC: 102 MMOL/L (ref 98–107)
CREAT SERPL-MCNC: 0.7 MG/DL (ref 0.51–0.95)
EGFRCR SERPLBLD CKD-EPI 2021: >90 ML/MIN/1.73M2
GLUCOSE SERPL-MCNC: 91 MG/DL (ref 70–99)
HCO3 SERPL-SCNC: 27 MMOL/L (ref 22–29)
POTASSIUM SERPL-SCNC: 4 MMOL/L (ref 3.4–5.3)
PTH-INTACT SERPL-MCNC: 38 PG/ML (ref 15–65)
SODIUM SERPL-SCNC: 138 MMOL/L (ref 135–145)
VIT D+METAB SERPL-MCNC: 87 NG/ML (ref 20–50)

## 2025-07-28 LAB
DEPRECATED CALCIDIOL+CALCIFEROL SERPL-MC: <82 UG/L (ref 20–75)
VITAMIN D2 SERPL-MCNC: <5 UG/L
VITAMIN D3 SERPL-MCNC: 77 UG/L